# Patient Record
Sex: FEMALE | Race: WHITE | NOT HISPANIC OR LATINO | URBAN - METROPOLITAN AREA
[De-identification: names, ages, dates, MRNs, and addresses within clinical notes are randomized per-mention and may not be internally consistent; named-entity substitution may affect disease eponyms.]

---

## 2019-11-19 ENCOUNTER — IMPORTED ENCOUNTER (OUTPATIENT)
Dept: URBAN - METROPOLITAN AREA CLINIC 9 | Facility: CLINIC | Age: 64
End: 2019-11-19

## 2020-01-29 ENCOUNTER — IMPORTED ENCOUNTER (OUTPATIENT)
Dept: URBAN - METROPOLITAN AREA CLINIC 9 | Facility: CLINIC | Age: 65
End: 2020-01-29

## 2020-03-11 ENCOUNTER — IMPORTED ENCOUNTER (OUTPATIENT)
Dept: URBAN - METROPOLITAN AREA CLINIC 9 | Facility: CLINIC | Age: 65
End: 2020-03-11

## 2020-03-18 ENCOUNTER — IMPORTED ENCOUNTER (OUTPATIENT)
Dept: URBAN - METROPOLITAN AREA CLINIC 9 | Facility: CLINIC | Age: 65
End: 2020-03-18

## 2020-07-15 ENCOUNTER — IMPORTED ENCOUNTER (OUTPATIENT)
Dept: URBAN - METROPOLITAN AREA CLINIC 9 | Facility: CLINIC | Age: 65
End: 2020-07-15

## 2021-02-25 ENCOUNTER — IMPORTED ENCOUNTER (OUTPATIENT)
Dept: URBAN - METROPOLITAN AREA CLINIC 9 | Facility: CLINIC | Age: 66
End: 2021-02-25

## 2021-03-17 ENCOUNTER — IMPORTED ENCOUNTER (OUTPATIENT)
Dept: URBAN - METROPOLITAN AREA CLINIC 9 | Facility: CLINIC | Age: 66
End: 2021-03-17

## 2021-05-04 NOTE — PATIENT DISCUSSION
- Follow up in 1 year for [facility] SERVICES OF [facility]

## 2021-09-15 ENCOUNTER — IMPORTED ENCOUNTER (OUTPATIENT)
Dept: URBAN - METROPOLITAN AREA CLINIC 9 | Facility: CLINIC | Age: 66
End: 2021-09-15

## 2021-09-15 PROBLEM — H40.1131: Noted: 2021-09-15

## 2021-09-15 PROBLEM — H04.123: Noted: 2021-09-15

## 2021-09-15 PROBLEM — H25.13: Noted: 2021-09-15

## 2021-09-15 PROBLEM — H35.033: Noted: 2021-09-15

## 2021-09-15 PROBLEM — H53.453: Noted: 2021-09-15

## 2021-09-15 PROBLEM — H25.12: Noted: 2021-09-15

## 2021-10-16 ASSESSMENT — TONOMETRY
OS_IOP_MMHG: 14
OD_IOP_MMHG: 17
OD_IOP_MMHG: 17
OS_IOP_MMHG: 12
OS_IOP_MMHG: 17
OD_IOP_MMHG: 13
OS_IOP_MMHG: 15
OD_IOP_MMHG: 15

## 2021-10-16 ASSESSMENT — VISUAL ACUITY
OD_CC: 20/30 SN
OS_SC: 20/70 SN
OD_SC: 20/40 - SN
OS_CC: 20/30 - SN
OS_CC: 20/30 SN
OD_CC: 20/25 -2 SN
OD_CC: 20/25 -2 SN
OD_SC: 20/60 - SN
OD_CC: 20/30 -2 SN
OD_CC: 20/25 SN
OS_SC: 20/50 -2 SN
OD_CC: 20/25 SN
OD_CC: 20/30 SN
OD_CC: 20/40 + SN
OS_CC: 20/25 + SN
OS_CC: 20/25 -2 SN
OS_CC: 20/20 -2 SN
OS_CC: 20/25 SN
OS_CC: 20/20 -2 SN
OS_CC: 20/25 - SN

## 2021-10-16 ASSESSMENT — PACHYMETRY
OS_CT_UM: 615.0
OD_CT_UM: 593.0

## 2022-03-17 ENCOUNTER — DIAGNOSTICS ONLY (OUTPATIENT)
Dept: URBAN - METROPOLITAN AREA CLINIC 14 | Facility: CLINIC | Age: 67
End: 2022-03-17

## 2022-03-17 DIAGNOSIS — H40.1131: ICD-10-CM

## 2022-03-17 PROCEDURE — 92083 EXTENDED VISUAL FIELD XM: CPT

## 2022-03-22 ENCOUNTER — ESTABLISHED PATIENT (OUTPATIENT)
Dept: URBAN - METROPOLITAN AREA CLINIC 14 | Facility: CLINIC | Age: 67
End: 2022-03-22

## 2022-03-22 DIAGNOSIS — H25.13: ICD-10-CM

## 2022-03-22 DIAGNOSIS — H35.033: ICD-10-CM

## 2022-03-22 DIAGNOSIS — H40.1131: ICD-10-CM

## 2022-03-22 PROCEDURE — 92014 COMPRE OPH EXAM EST PT 1/>: CPT

## 2022-03-22 PROCEDURE — 92133 CPTRZD OPH DX IMG PST SGM ON: CPT

## 2022-03-22 PROCEDURE — 92015 DETERMINE REFRACTIVE STATE: CPT

## 2022-03-22 ASSESSMENT — VISUAL ACUITY
OD_CC: 20/60
OS_CC: 20/25-1
OS_GLARE: 20/40
OD_PH: 20/30
OD_GLARE: 20/40

## 2022-03-22 ASSESSMENT — TONOMETRY
OS_IOP_MMHG: 11
OD_IOP_MMHG: 12

## 2022-07-06 RX ORDER — OMEPRAZOLE 20 MG/1
1 CAPSULE, DELAYED RELEASE ORAL
COMMUNITY

## 2022-07-06 RX ORDER — LISINOPRIL AND HYDROCHLOROTHIAZIDE 25; 20 MG/1; MG/1
TABLET ORAL
COMMUNITY
Start: 2022-03-18

## 2022-07-06 RX ORDER — ROSUVASTATIN CALCIUM 20 MG/1
TABLET, COATED ORAL
COMMUNITY
End: 2022-10-05 | Stop reason: CLARIF

## 2022-07-06 RX ORDER — LATANOPROST 50 UG/ML
SOLUTION/ DROPS OPHTHALMIC
COMMUNITY

## 2022-09-30 PROBLEM — E78.5 HYPERLIPIDEMIA: Status: ACTIVE | Noted: 2022-09-30

## 2022-09-30 PROBLEM — I10 ESSENTIAL HYPERTENSION: Status: ACTIVE | Noted: 2022-09-30

## 2022-09-30 PROBLEM — H40.2290 CHRONIC ANGLE-CLOSURE GLAUCOMA: Status: ACTIVE | Noted: 2022-09-30

## 2022-09-30 PROBLEM — R73.01 IMPAIRED FASTING BLOOD SUGAR: Status: ACTIVE | Noted: 2022-09-30

## 2022-09-30 PROBLEM — E66.3 OVERWEIGHT (BMI 25.0-29.9): Status: ACTIVE | Noted: 2022-09-30

## 2022-09-30 PROBLEM — K21.9 GASTROESOPHAGEAL REFLUX DISEASE: Status: ACTIVE | Noted: 2022-09-30

## 2022-09-30 PROBLEM — K25.9 GASTRIC ULCER WITHOUT HEMORRHAGE, PERFORATION, OR OBSTRUCTION: Status: ACTIVE | Noted: 2022-09-30

## 2022-09-30 PROBLEM — H40.1130 PRIMARY OPEN ANGLE GLAUCOMA (POAG) OF BOTH EYES: Status: ACTIVE | Noted: 2022-09-30

## 2022-10-05 ENCOUNTER — ESTABLISHED PATIENT (OUTPATIENT)
Dept: URBAN - METROPOLITAN AREA CLINIC 14 | Facility: CLINIC | Age: 67
End: 2022-10-05

## 2022-10-05 DIAGNOSIS — H25.13: ICD-10-CM

## 2022-10-05 DIAGNOSIS — H40.1131: ICD-10-CM

## 2022-10-05 DIAGNOSIS — H43.813: ICD-10-CM

## 2022-10-05 PROBLEM — H40.1130 PRIMARY OPEN ANGLE GLAUCOMA (POAG) OF BOTH EYES: Status: RESOLVED | Noted: 2022-09-30 | Resolved: 2022-10-05

## 2022-10-05 PROCEDURE — 92014 COMPRE OPH EXAM EST PT 1/>: CPT

## 2022-10-05 ASSESSMENT — VISUAL ACUITY
OS_CC: 20/30-1
OU_CC: 20/30-1
OD_CC: 20/30-1

## 2022-10-05 ASSESSMENT — TONOMETRY
OS_IOP_MMHG: 16
OD_IOP_MMHG: 18

## 2023-04-12 ENCOUNTER — ESTABLISHED PATIENT (OUTPATIENT)
Dept: URBAN - METROPOLITAN AREA CLINIC 14 | Facility: CLINIC | Age: 68
End: 2023-04-12

## 2023-04-12 DIAGNOSIS — H35.033: ICD-10-CM

## 2023-04-12 DIAGNOSIS — H04.123: ICD-10-CM

## 2023-04-12 DIAGNOSIS — H25.13: ICD-10-CM

## 2023-04-12 DIAGNOSIS — H43.813: ICD-10-CM

## 2023-04-12 DIAGNOSIS — H40.1132: ICD-10-CM

## 2023-04-12 PROCEDURE — 92133 CPTRZD OPH DX IMG PST SGM ON: CPT

## 2023-04-12 PROCEDURE — 92015 DETERMINE REFRACTIVE STATE: CPT

## 2023-04-12 PROCEDURE — 99214 OFFICE O/P EST MOD 30 MIN: CPT

## 2023-04-12 PROCEDURE — 92020 GONIOSCOPY: CPT

## 2023-04-12 ASSESSMENT — TONOMETRY
OS_IOP_MMHG: 17
OD_IOP_MMHG: 18

## 2023-04-12 ASSESSMENT — VISUAL ACUITY
OS_SC: 20/100
OU_SC: 20/80-1
OU_CC: 20/25-1
OS_CC: 20/25-1
OD_CC: 20/30-2
OD_SC: 20/80-1

## 2023-04-28 ENCOUNTER — CLINIC PROCEDURE ONLY (OUTPATIENT)
Dept: URBAN - METROPOLITAN AREA CLINIC 9 | Facility: CLINIC | Age: 68
End: 2023-04-28

## 2023-04-28 DIAGNOSIS — H40.1132: ICD-10-CM

## 2023-04-28 PROCEDURE — 65855 TRABECULOPLASTY LASER SURG: CPT

## 2023-04-28 ASSESSMENT — TONOMETRY
OD_IOP_MMHG: 12
OS_IOP_MMHG: 11

## 2023-04-28 ASSESSMENT — VISUAL ACUITY
OS_CC: 20/20-2
OD_CC: 20/30-2

## 2023-05-12 ENCOUNTER — CLINIC PROCEDURE ONLY (OUTPATIENT)
Dept: URBAN - METROPOLITAN AREA CLINIC 9 | Facility: CLINIC | Age: 68
End: 2023-05-12

## 2023-05-12 DIAGNOSIS — H40.1132: ICD-10-CM

## 2023-05-12 PROCEDURE — 65855 TRABECULOPLASTY LASER SURG: CPT

## 2023-05-12 ASSESSMENT — TONOMETRY
OD_IOP_MMHG: 15
OS_IOP_MMHG: 17

## 2023-05-12 ASSESSMENT — VISUAL ACUITY
OD_CC: 20/30-1
OS_CC: 20/25+2

## 2023-05-31 ENCOUNTER — ESTABLISHED PATIENT (OUTPATIENT)
Dept: URBAN - METROPOLITAN AREA CLINIC 14 | Facility: CLINIC | Age: 68
End: 2023-05-31

## 2023-05-31 DIAGNOSIS — H40.1132: ICD-10-CM

## 2023-05-31 PROCEDURE — 99213 OFFICE O/P EST LOW 20 MIN: CPT

## 2023-05-31 ASSESSMENT — TONOMETRY
OD_IOP_MMHG: 15
OS_IOP_MMHG: 13

## 2023-05-31 ASSESSMENT — VISUAL ACUITY
OS_CC: 20/30
OD_CC: 20/30

## 2023-09-06 ENCOUNTER — ESTABLISHED PATIENT (OUTPATIENT)
Dept: URBAN - METROPOLITAN AREA CLINIC 14 | Facility: CLINIC | Age: 68
End: 2023-09-06

## 2023-09-06 DIAGNOSIS — H25.13: ICD-10-CM

## 2023-09-06 DIAGNOSIS — H43.813: ICD-10-CM

## 2023-09-06 DIAGNOSIS — H04.123: ICD-10-CM

## 2023-09-06 DIAGNOSIS — H35.033: ICD-10-CM

## 2023-09-06 DIAGNOSIS — H40.1132: ICD-10-CM

## 2023-09-06 PROCEDURE — 99213 OFFICE O/P EST LOW 20 MIN: CPT

## 2023-09-06 ASSESSMENT — VISUAL ACUITY
OD_CC: 20/30
OS_CC: 20/30

## 2023-09-06 ASSESSMENT — TONOMETRY
OS_IOP_MMHG: 15
OD_IOP_MMHG: 14

## 2024-03-07 ENCOUNTER — DIAGNOSTICS ONLY (OUTPATIENT)
Dept: URBAN - METROPOLITAN AREA CLINIC 14 | Facility: CLINIC | Age: 69
End: 2024-03-07

## 2024-03-07 DIAGNOSIS — H40.1132: ICD-10-CM

## 2024-03-07 PROCEDURE — 92083 EXTENDED VISUAL FIELD XM: CPT

## 2024-03-13 ENCOUNTER — ESTABLISHED PATIENT (OUTPATIENT)
Dept: URBAN - METROPOLITAN AREA CLINIC 14 | Facility: CLINIC | Age: 69
End: 2024-03-13

## 2024-03-13 DIAGNOSIS — H25.13: ICD-10-CM

## 2024-03-13 DIAGNOSIS — H04.123: ICD-10-CM

## 2024-03-13 DIAGNOSIS — H40.1132: ICD-10-CM

## 2024-03-13 DIAGNOSIS — H35.033: ICD-10-CM

## 2024-03-13 DIAGNOSIS — H43.813: ICD-10-CM

## 2024-03-13 DIAGNOSIS — H35.363: ICD-10-CM

## 2024-03-13 PROCEDURE — 99214 OFFICE O/P EST MOD 30 MIN: CPT

## 2024-03-13 PROCEDURE — 92134 CPTRZ OPH DX IMG PST SGM RTA: CPT

## 2024-03-13 PROCEDURE — 92015 DETERMINE REFRACTIVE STATE: CPT

## 2024-03-13 ASSESSMENT — VISUAL ACUITY
OS_SC: 20/80-1
OD_PH: 20/40
OU_CC: 20/30-1
OD_CC: 20/80-1
OS_CC: 20/30-1

## 2024-03-13 ASSESSMENT — TONOMETRY
OS_IOP_MMHG: 17
OD_IOP_MMHG: 17

## 2024-03-20 ENCOUNTER — PRE-OP/H&P (OUTPATIENT)
Dept: URBAN - METROPOLITAN AREA CLINIC 14 | Facility: CLINIC | Age: 69
End: 2024-03-20

## 2024-03-20 DIAGNOSIS — H35.033: ICD-10-CM

## 2024-03-20 DIAGNOSIS — H43.813: ICD-10-CM

## 2024-03-20 DIAGNOSIS — H40.1132: ICD-10-CM

## 2024-03-20 DIAGNOSIS — H04.123: ICD-10-CM

## 2024-03-20 DIAGNOSIS — H35.363: ICD-10-CM

## 2024-03-20 DIAGNOSIS — H25.13: ICD-10-CM

## 2024-03-20 PROCEDURE — 99211PRE PRE OP VISIT

## 2024-03-20 PROCEDURE — 92136 OPHTHALMIC BIOMETRY: CPT

## 2024-03-20 PROCEDURE — 92133 CPTRZD OPH DX IMG PST SGM ON: CPT

## 2024-05-02 ENCOUNTER — SURGERY/PROCEDURE (OUTPATIENT)
Dept: URBAN - METROPOLITAN AREA CLINIC 14 | Facility: CLINIC | Age: 69
End: 2024-05-02

## 2024-05-02 DIAGNOSIS — H40.1132: ICD-10-CM

## 2024-05-02 DIAGNOSIS — H25.13: ICD-10-CM

## 2024-05-02 PROCEDURE — 65820 GONIOTOMY: CPT

## 2024-05-02 PROCEDURE — 66984 XCAPSL CTRC RMVL W/O ECP: CPT

## 2024-05-03 ENCOUNTER — POST OP/EVAL OF SECOND EYE (OUTPATIENT)
Facility: LOCATION | Age: 69
End: 2024-05-03

## 2024-05-03 DIAGNOSIS — H40.1132: ICD-10-CM

## 2024-05-03 DIAGNOSIS — H25.12: ICD-10-CM

## 2024-05-03 DIAGNOSIS — Z98.41: ICD-10-CM

## 2024-05-03 DIAGNOSIS — Z98.890: ICD-10-CM

## 2024-05-03 PROCEDURE — 92136 OPHTHALMIC BIOMETRY: CPT | Mod: 26

## 2024-05-03 ASSESSMENT — VISUAL ACUITY
OS_PH: 20/40
OS_SC: 20/100
OD_SC: 20/200

## 2024-05-03 ASSESSMENT — TONOMETRY
OD_IOP_MMHG: 17
OS_IOP_MMHG: 18

## 2024-05-08 ENCOUNTER — POST OP/EVAL OF SECOND EYE (OUTPATIENT)
Dept: URBAN - METROPOLITAN AREA CLINIC 14 | Facility: CLINIC | Age: 69
End: 2024-05-08

## 2024-05-08 DIAGNOSIS — Z98.890: ICD-10-CM

## 2024-05-08 DIAGNOSIS — H40.1132: ICD-10-CM

## 2024-05-08 DIAGNOSIS — Z98.41: ICD-10-CM

## 2024-05-08 ASSESSMENT — TONOMETRY
OD_IOP_MMHG: 15
OS_IOP_MMHG: 14

## 2024-05-08 ASSESSMENT — VISUAL ACUITY
OD_SC: 20/30
OS_PH: 20/40
OS_SC: 20/50-1

## 2024-05-17 ENCOUNTER — POST-OP (OUTPATIENT)
Facility: LOCATION | Age: 69
End: 2024-05-17

## 2024-05-17 DIAGNOSIS — Z98.890: ICD-10-CM

## 2024-05-17 DIAGNOSIS — Z98.41: ICD-10-CM

## 2024-05-17 DIAGNOSIS — Z98.42: ICD-10-CM

## 2024-05-17 ASSESSMENT — VISUAL ACUITY
OS_SC: 20/50
OS_PH: 20/50+1

## 2024-05-17 ASSESSMENT — TONOMETRY: OS_IOP_MMHG: 18

## 2024-06-05 ENCOUNTER — TECH ONLY (OUTPATIENT)
Dept: URBAN - METROPOLITAN AREA CLINIC 14 | Facility: CLINIC | Age: 69
End: 2024-06-05

## 2024-06-05 DIAGNOSIS — Z98.42: ICD-10-CM

## 2024-06-05 DIAGNOSIS — Z98.41: ICD-10-CM

## 2024-06-05 DIAGNOSIS — Z98.890: ICD-10-CM

## 2024-06-05 PROCEDURE — 99211NC NO CHARGE VISIT

## 2024-06-05 ASSESSMENT — VISUAL ACUITY
OD_SC: 20/30
OS_SC: 20/30+1

## 2024-06-19 ENCOUNTER — POST-OP (OUTPATIENT)
Dept: URBAN - METROPOLITAN AREA CLINIC 14 | Facility: CLINIC | Age: 69
End: 2024-06-19

## 2024-06-19 DIAGNOSIS — Z98.42: ICD-10-CM

## 2024-06-19 DIAGNOSIS — H40.1132: ICD-10-CM

## 2024-06-19 DIAGNOSIS — Z98.890: ICD-10-CM

## 2024-06-19 DIAGNOSIS — Z98.41: ICD-10-CM

## 2024-06-19 ASSESSMENT — VISUAL ACUITY
OD_SC: 20/30-1
OS_SC: 20/25

## 2024-06-19 ASSESSMENT — TONOMETRY
OD_IOP_MMHG: 14
OS_IOP_MMHG: 14

## 2024-11-06 ENCOUNTER — COMPREHENSIVE EXAM (OUTPATIENT)
Dept: URBAN - METROPOLITAN AREA CLINIC 14 | Facility: CLINIC | Age: 69
End: 2024-11-06

## 2024-11-06 DIAGNOSIS — H40.1132: ICD-10-CM

## 2024-11-06 DIAGNOSIS — Z98.42: ICD-10-CM

## 2024-11-06 DIAGNOSIS — Z98.890: ICD-10-CM

## 2024-11-06 DIAGNOSIS — Z98.41: ICD-10-CM

## 2024-11-06 PROCEDURE — 99213 OFFICE O/P EST LOW 20 MIN: CPT

## 2025-05-07 ENCOUNTER — FOLLOW UP (OUTPATIENT)
Age: 70
End: 2025-05-07

## 2025-05-07 DIAGNOSIS — H40.1132: ICD-10-CM

## 2025-05-07 DIAGNOSIS — H04.123: ICD-10-CM

## 2025-05-07 PROCEDURE — G2211 COMPLEX E/M VISIT ADD ON: HCPCS

## 2025-05-07 PROCEDURE — 92083 EXTENDED VISUAL FIELD XM: CPT

## 2025-05-07 PROCEDURE — 99213 OFFICE O/P EST LOW 20 MIN: CPT
